# Patient Record
(demographics unavailable — no encounter records)

---

## 2025-01-13 NOTE — DISCUSSION/SUMMARY
[Normal Growth] : growth [Normal Development] : development  [No Elimination Concerns] : elimination [Continue Regimen] : feeding [No Skin Concerns] : skin [Normal Sleep Pattern] : sleep [None] : no medical problems [Anticipatory Guidance Given] : Anticipatory guidance addressed as per the history of present illness section [No Vaccines] : no vaccines needed [No Medications] : ~He/She~ is not on any medications [] : The components of the vaccine(s) to be administered today are listed in the plan of care. The disease(s) for which the vaccine(s) are intended to prevent and the risks have been discussed with the caretaker.  The risks are also included in the appropriate vaccination information statements which have been provided to the patient's caregiver.  The caregiver has given consent to vaccinate. [FreeTextEntry1] : Well 5 year old Growth and development: normal Discussed safety/anticipatory guidance Discussed school readiness/transition Discussed need for vaccines, reviewed side effects and VIS PPD/assess TB risk (prior to school entry) Next PE: in 1 year  Discussed and/or provided information on the following: SCHOOL READINESS: Established routines; after-school care and activities; parent-teacher communications; friends; bullying; maturity; management of disappointments; fears MENTAL HEALTH: Family time; routines; temper problems; social interventions NUTRITION: Healthy weight; appropriate well-balanced diet; increased fruit, vegetable, and whole grain consumption; adequate calcium intake PHYSICAL ACTIVITY: 60 minutes of exercise a day; playing a sport ORAL HEALTH: Regular visits with dentist; daily brushing and flossing; adequate fluoride SAFETY: Pedestrian safety; booster seat; safety helmets; swimming safety; child sexual abuse prevention; fire escape/drill plan and smoke detectors, carbon monoxide detectors/alarms; guns

## 2025-01-13 NOTE — HISTORY OF PRESENT ILLNESS
[Normal] : Normal [No] : No cigarette smoke exposure [Water heater temperature set at <120 degrees F] : Water heater temperature set at <120 degrees F [Car seat in back seat] : Car seat in back seat [Carbon Monoxide Detectors] : Carbon monoxide detectors [Smoke Detectors] : Smoke detectors [Supervised outdoor play] : Supervised outdoor play

## 2025-01-13 NOTE — PHYSICAL EXAM

## 2025-06-20 NOTE — PHYSICAL EXAM
[Soft] : soft [Tender] : nontender [Distended] : nondistended [Normal Bowel Sounds] : normal bowel sounds [McBurney's point tenderness] : no McBurney's point tenderness [Rebound tenderness] : no rebound tenderness [NL] : warm, clear

## 2025-06-20 NOTE — HISTORY OF PRESENT ILLNESS
[de-identified] : Abdominal pain for 4 days, mostly in the morning, no appetite and nausea [FreeTextEntry6] : abdominal pain x4 days with nausea. no vomiting or diarrhea. last BM yesterday, normal as per mother. PMH constipation. on daily miralax good UOP.  denies sore throat, HA, or other viral symptoms.  no fevers

## 2025-06-20 NOTE — DISCUSSION/SUMMARY
[FreeTextEntry1] : Increase fluids. [place] diet. continue miralax as needed for history of constipation. high fiber diet. Probiotic daily. any new or worsening symptoms to call office or recheck PRN